# Patient Record
Sex: MALE | Race: WHITE | Employment: UNEMPLOYED | URBAN - METROPOLITAN AREA
[De-identification: names, ages, dates, MRNs, and addresses within clinical notes are randomized per-mention and may not be internally consistent; named-entity substitution may affect disease eponyms.]

---

## 2023-01-11 ENCOUNTER — HOSPITAL ENCOUNTER (EMERGENCY)
Age: 23
Discharge: HOME | End: 2023-01-11
Attending: STUDENT IN AN ORGANIZED HEALTH CARE EDUCATION/TRAINING PROGRAM

## 2023-01-11 VITALS
SYSTOLIC BLOOD PRESSURE: 127 MMHG | OXYGEN SATURATION: 96 % | TEMPERATURE: 97.7 F | WEIGHT: 185 LBS | HEART RATE: 93 BPM | HEIGHT: 67 IN | RESPIRATION RATE: 16 BRPM | BODY MASS INDEX: 29.03 KG/M2 | DIASTOLIC BLOOD PRESSURE: 87 MMHG

## 2023-01-11 DIAGNOSIS — Z20.2 EXPOSURE TO STD: ICD-10-CM

## 2023-01-11 DIAGNOSIS — Z87.19 HX OF HEMORRHOIDS: Primary | ICD-10-CM

## 2023-01-11 LAB
AMORPHOUS: ABNORMAL /HPF
BACTERIA: ABNORMAL /HPF
BILIRUBIN URINE: NEGATIVE
BLOOD, URINE: NEGATIVE
C. TRACHOMATIS DNA ,URINE: NEGATIVE
CLARITY: CLEAR
COLOR: YELLOW
GLUCOSE URINE: NEGATIVE MG/DL
KETONES, URINE: NEGATIVE MG/DL
LEUKOCYTE ESTERASE, URINE: NEGATIVE
MICROSCOPIC EXAMINATION: YES
MUCUS: ABNORMAL /LPF
N. GONORRHOEAE DNA, URINE: NEGATIVE
NITRITE, URINE: NEGATIVE
OCCULT BLOOD DIAGNOSTIC: NORMAL
PH UA: 8 (ref 5–8)
PROTEIN UA: ABNORMAL MG/DL
RBC UA: ABNORMAL /HPF (ref 0–4)
SPECIFIC GRAVITY UA: 1.01 (ref 1–1.03)
URINE TYPE: ABNORMAL
UROBILINOGEN, URINE: 4 E.U./DL
WBC UA: ABNORMAL /HPF (ref 0–5)

## 2023-01-11 PROCEDURE — 82270 OCCULT BLOOD FECES: CPT

## 2023-01-11 PROCEDURE — 81001 URINALYSIS AUTO W/SCOPE: CPT

## 2023-01-11 PROCEDURE — 87591 N.GONORRHOEAE DNA AMP PROB: CPT

## 2023-01-11 PROCEDURE — 87491 CHLMYD TRACH DNA AMP PROBE: CPT

## 2023-01-11 PROCEDURE — 99283 EMERGENCY DEPT VISIT LOW MDM: CPT

## 2023-01-11 RX ORDER — ALBUTEROL SULFATE 90 UG/1
2 AEROSOL, METERED RESPIRATORY (INHALATION) 4 TIMES DAILY PRN
Qty: 18 G | Refills: 0 | Status: SHIPPED | OUTPATIENT
Start: 2023-01-11 | End: 2023-01-22 | Stop reason: ALTCHOICE

## 2023-01-11 ASSESSMENT — PAIN - FUNCTIONAL ASSESSMENT: PAIN_FUNCTIONAL_ASSESSMENT: NONE - DENIES PAIN

## 2023-01-11 NOTE — ED PROVIDER NOTES
Emergency Department Encounter    Patient: Han Matthews  MRN: [id number]  : 2000  Date of Evaluation: 2023  ED Provider:  Mary Alice Harmon MD    Triage Chief Complaint:   No chief complaint on file. [place]:  Han Matthews is a 20 y.o. male that presents ***    ROS - see HPI, below listed is current ROS at time of my eval:  At least 14 systems reviewed, negative other HPI    No past medical history on file. No past surgical history on file. No family history on file. Social History     Socioeconomic History    Marital status: Single     Spouse name: Not on file    Number of children: Not on file    Years of education: Not on file    Highest education level: Not on file   Occupational History    Not on file   Tobacco Use    Smoking status: Not on file    Smokeless tobacco: Not on file   Substance and Sexual Activity    Alcohol use: Not on file    Drug use: Not on file    Sexual activity: Not on file   Other Topics Concern    Not on file   Social History Narrative    Not on file     Social Determinants of Health     Financial Resource Strain: Not on file   Food Insecurity: Not on file   Transportation Needs: Not on file   Physical Activity: Not on file   Stress: Not on file   Social Connections: Not on file   Intimate Partner Violence: Not on file   Housing Stability: Not on file     No current facility-administered medications for this encounter. No current outpatient medications on file. Not on File    Nursing Notes Reviewed    Physical Exam:  Triage VS:    ED Triage Vitals   Enc Vitals Group      BP       Pulse       Resp       Temp       Temp src       SpO2       Weight       Height       Head Circumference       Peak Flow       Pain Score       Pain Loc       Pain Edu? Excl. in [address]? My pulse ox interpretation is - normal    General appearance:  No acute distress. Skin:  Warm. Dry. Eye:  Extraocular movements intact.      Ears, nose, mouth and throat:  Oral mucosa moist Neck:  Trachea midline. Extremity:  No swelling. Normal ROM     Heart:  Regular rate and rhythm, normal S1 & S2, no extra heart sounds. Perfusion:  intact  Respiratory:  Lungs clear to auscultation bilaterally. Respirations nonlabored. Abdominal:  Normal bowel sounds. Soft. Nontender. Non distended. Back:  No CVA tenderness to palpation     Neurological:  Alert and oriented times 3. No focal neuro deficits. Psychiatric:  Appropriate    I have reviewed and interpreted all of the currently available lab results from this visit (if applicable):  No results found for this visit on 01/11/23. Radiographs (if obtained):  Radiologist's Report Reviewed:  No results found. EKG (if obtained): (All EKG's are interpreted by myself in the absence of a cardiologist)      MDM:  ***    Clinical Impression:  No diagnosis found. Disposition referral (if applicable):  No follow-up provider specified. Disposition medications (if applicable):  New Prescriptions    No medications on file     ED Provider Disposition Time  DISPOSITION        Comment: Please note this report has been produced using speech recognition software and may contain errors related to that system including errors in grammar, punctuation, and spelling, as well as words and phrases that may be inappropriate. Efforts were made to edit the dictations. Radiographs (if obtained):  Radiologist's Report Reviewed:  No results found. MDM:    76-year-old male present with history seen above. Vitals on presentation reassuring patient afebrile satting on room air. Physical exam can be seen above. Lungs are clear to auscultation, cardiac Ulises is reassuring, abdomen soft and nontender. Rectal exam I do not note any fistulas or fissures do not note any external hemorrhoids. Hemoccult is negative. Patient did have some left lower quadrant abdominal discomfort earlier this morning but denies any pain currently. I did discuss laboratory evaluation as well as CT imaging and risk and benefits and patient states he is only here because his mother asked me to come in to be evaluated does not wish to have any further testing performed at this time. As far as STIs patient states he was like to be tested. Patient denies any symptoms currently. I discussed that I cannot test for gonorrhea and chlamydia but that further testing of HIV and other STIs will need to be performed at the health department versus primary physician. Patient is agreeable to this. Gonorrhea and chlamydia was ordered and pending. Patient is overall very well-appearing I do believe is reasonable to hold off on any further evaluation as long as patient is agreeable to strict return precautions which he has as well as \close follow-up. Patient discharged home. Clinical Impression:  1. Hx of hemorrhoids    2. Exposure to STD          Comment: Please note this report has been produced using speech recognition software and may contain errors related to that system including errors in grammar, punctuation, and spelling, as well as words and phrases that may be inappropriate. Efforts were made to edit the dictations.         Zev James MD  01/19/23 1005

## 2023-01-11 NOTE — DISCHARGE INSTRUCTIONS
Follow-up with primary physician. I ordered for an urgent PCP follow-up for you. Return to emergency department for any abdominal pain, worsening rectal bleeding, only has her dizziness, chest pain or shortness of breath, fevers or chills or any new change or worsening symptoms were always here for evaluation. I do understand that you do not want any laboratory evaluation or imaging at this time and that you just want to check to see if he had hemorrhoids.   I do suggest that you follow-up with primary physician for further evaluation and treatment as well as testing for other STIs including HIV

## 2023-01-22 ENCOUNTER — HOSPITAL ENCOUNTER (EMERGENCY)
Age: 23
Discharge: HOME | End: 2023-01-22

## 2023-01-22 VITALS
TEMPERATURE: 98.2 F | BODY MASS INDEX: 28.04 KG/M2 | DIASTOLIC BLOOD PRESSURE: 78 MMHG | WEIGHT: 185 LBS | HEIGHT: 68 IN | SYSTOLIC BLOOD PRESSURE: 128 MMHG | OXYGEN SATURATION: 98 % | RESPIRATION RATE: 16 BRPM | HEART RATE: 97 BPM

## 2023-01-22 DIAGNOSIS — K62.5 RECTAL BLEEDING: Primary | ICD-10-CM

## 2023-01-22 DIAGNOSIS — K60.2 ANAL FISSURE: ICD-10-CM

## 2023-01-22 DIAGNOSIS — K59.00 CONSTIPATION, UNSPECIFIED CONSTIPATION TYPE: ICD-10-CM

## 2023-01-22 PROCEDURE — 6370000000 HC RX 637 (ALT 250 FOR IP): Performed by: PHYSICIAN ASSISTANT

## 2023-01-22 PROCEDURE — 99283 EMERGENCY DEPT VISIT LOW MDM: CPT

## 2023-01-22 PROCEDURE — 36415 COLL VENOUS BLD VENIPUNCTURE: CPT

## 2023-01-22 RX ORDER — DOXYCYCLINE HYCLATE 100 MG
100 TABLET ORAL ONCE
Status: COMPLETED | OUTPATIENT
Start: 2023-01-22 | End: 2023-01-22

## 2023-01-22 RX ORDER — DOXYCYCLINE HYCLATE 100 MG
100 TABLET ORAL 2 TIMES DAILY
Qty: 14 TABLET | Refills: 0 | Status: SHIPPED | OUTPATIENT
Start: 2023-01-22 | End: 2023-01-29

## 2023-01-22 RX ORDER — DOCUSATE SODIUM 100 MG/1
100 CAPSULE, LIQUID FILLED ORAL 2 TIMES DAILY
Qty: 60 CAPSULE | Refills: 0 | Status: SHIPPED | OUTPATIENT
Start: 2023-01-22 | End: 2023-02-21

## 2023-01-22 RX ORDER — POLYETHYLENE GLYCOL 3350 17 G/17G
17 POWDER, FOR SOLUTION ORAL 2 TIMES DAILY
Qty: 510 G | Refills: 0 | Status: SHIPPED | OUTPATIENT
Start: 2023-01-22 | End: 2023-02-21

## 2023-01-22 RX ADMIN — DOXYCYCLINE HYCLATE 100 MG: 100 TABLET, COATED ORAL at 17:43

## 2023-01-22 ASSESSMENT — PAIN - FUNCTIONAL ASSESSMENT: PAIN_FUNCTIONAL_ASSESSMENT: NONE - DENIES PAIN
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
## 2023-01-22 NOTE — DISCHARGE INSTRUCTIONS
Bloody Stools  Bloody stools (feces) often mean that there is a problem in the digestive tract. Your caregiver may use the term melena to describe black, tarry and bad smelling stools or hematochezia to describe red or maroon-colored stools. Blood seen in the stool can start anywhere along the intestinal tract. A black stool usually means that blood is coming from the upper part of the gastrointestinal (GI) tract (esophagus, stomach, or small bowel). Passing maroon-colored stools or bright red blood (hematochezia) usually suggests that blood is coming from lower down in the large bowel or the rectum. However, sometimes massive bleeding in the stomach or small intestine can cause bright red bloody stools. The ingestion of black licorice, lead, iron pills, medications containing bismuth subsalicylate, or blueberries can also cause black stools. Your caregiver can test black stools to see if blood is present. It is important that the cause of rectal bleeding be found. Treatment can then be started, and the problem corrected. Rectal bleeding may not be serious. But you should not assume everything is well until you know the cause. SOME CAUSES OF RECTAL BLEEDING   Hemorrhoids are larger (dilated) blood vessels or veins in the anal or rectal area. They can occur on the outside where they are felt as small bumps when wiping, or they may be on the inside where they are usually painless. They often develop with longstanding (chronic) constipation and during pregnancy. Hemorrhoids are usually treated with stool softeners to reduce straining. Fistulas are abnormal, burrowing channels. They usually run from inside the rectum to the skin around the anus. They may drain a whitish discharge. But they can also bleed. It is usually a local problem. But a fistula can be associated with chronic inflammation in other parts of the gut (intestinal tract) such as Crohn's disease.  Fistulas are treated with medications that kill germs (antibiotics) and hot baths. If they persist, surgery may be needed. A Fissure may occur with the passage of a hard stool or with severe diarrhea. The tissue lining the anus is torn. Nerve endings and blood vessels are exposed. So pain and bleeding occur with bowel movements. Frequent warm baths and bulking agents, used to keep stools soft, usually correct this problem. Sometimes surgery is needed. Diverticulosis is a condition in which pockets, or sacs, project from the bowel wall. The sacs balloon out over the years due to recurrent, high-pressure spasms of the colon. Sometimes, they can bleed. They usually produce a lot of blood, and it comes all at one time. Serious, persistent diverticular bleeding usually requires hospitalization and at times, surgery. Proctitis and Colitis are conditions in which the rectum, colon or both can become red and sore (inflamed) and pitted (ulcerated). There are a number of disorders that cause the inside bowel surface to become ulcerated and bleed. There may be rectal urgency, cramps or diarrhea associated with the bleeding. When the inflammation is restricted to the rectum, the condition is called proctitis. When the colon is involved, it is called colitis. It is important to find the specific cause of the inflammation so that treatment can be started. Polyps and Cancer - Polyps are non-cancerous (benign) growths in the colon that may bleed. Certain types of polyps turn into cancer. Colon cancer is often curable when discovered early. It most often happens in people over the age of 48. But it may happen in younger people, even those in their 29's or younger. Because colon cancer is such a common cancer, it is always considered a possible cause of bleeding. Rectal Prolapse - Often, as older individuals develop weakened rectal support tissues, part of the rectum can fall down from the anus and bleed. This condition is called rectal prolapse.  It can be felt as an abnormal bulging from the rectum when wiping. Surgery is the only effective treatment. Gastritis and Ulcers - Gastritis is inflammation and irritation of the stomach. Ulcers are pits in the lining of the stomach. Both can cause bleeding, sometimes in large amounts. Usually this bleeding appears as black, tarry and bad smelling stools (melena). Medications are usually used to treat the condition. SYMPTOMS  You may have stools that are bright red and bloody, that are normal color with blood on them, or that are dark black and tarry. You may only have blood in the toilet bowl. Any of these need medical care. You may also have:  Pain at the rectum or anywhere in the rectum. Lightheadedness or feel faint. Extreme weakness. Nausea or vomiting. Fever. DIAGNOSING RECTAL BLEEDING  The Medical History - Age is important. Older people tend to develop polyps and cancer more often. If there is anal pain and a hard, large stool associated with bleeding, a tear of the anus may be the cause. If blood drips into the toilet after a bowel movement, bleeding hemorrhoids may be the problem. The color and frequency of the bleeding are additional considerations. In most cases, the medical history provides clues, but seldom the final answer. The Visual and Finger (digital) Exam - Your caregiver will inspect the anal area looking for tears and hemorrhoids. A finger exam can provide information when there is tenderness or a growth inside. In men, the prostate is also examined. Endoscopy - Several types of small, long viewing scopes (endoscopes) are used to view the colon. In the office, the caregiver may use a rigid or more commonly, a flexible viewing sigmoidoscope. This exam is called flexible sigmoidoscopy. It is performed in 5 to 10 minutes. A more thorough exam is accomplished with a colonoscope. It allows the caregiver to view the entire 5 to 6 foot long colon.  Medicine to make the patient sleep (sedation) is usually given for this exam. Frequently, a bleeding lesion may present beyond the reach of the sigmoidoscope. So a colonoscopy may be the best exam to start with. Both exams are usually done on an outpatient basis. This means the patient does not stay overnight in the hospital or surgery center. An upper endoscopy may be needed to examine your stomach. Sedation is used and a flexible endoscope is put in your mouth down to your stomach. Barium Enema X-ray - This is an x-ray exam. It uses liquid barium inserted by enema into the rectum. This test alone may not identify an actual bleeding point. X-rays highlight abnormal shadows, such as those made by:  Lumps (tumors). Diverticuli. Colitis. TREATMENT  Treatment depends on the cause of your bleeding. For bleeding from the stomach or colon, the specialist doing your endoscopy or colonoscopy may be able to stop the bleeding as part of the procedure. Inflammation or infection of the colon can be treated with medications. Many rectal problems can be treated with creams, suppositories, and/or warm baths. Surgery is sometimes needed. For any bleeding problem, let your caregiver know if you take aspirin or other blood thinners regularly. Blood transfusions are sometimes needed if you have lost a lot of blood. HOME CARE INSTRUCTIONS  Take any medications exactly as prescribed. Keep your stools soft by eating a diet high in fiber and drinking lots of fluid. Prunes (one to three a day) work well for many people. Take sitz baths if advised. A sitz bath is when you sit in a bathtub with warm water for 10-15 minutes to soak, sooth and cleanse the rectal area. If enemas or suppositories are advised, be sure you know how to use them. Tell your caregiver if you have problems with this. Monitor your bowel movements to look for signs of improvement (or worsening). SEEK MEDICAL CARE IF:  You do not improve in the time expected. Your condition worsens after initial improvement.   You develop any new symptoms. SEEK IMMEDIATE MEDICAL CARE IF:  You develop severe or prolonged rectal bleeding. You vomit blood. You feel weak or faint. You develop a fever over 102° F (38.9° C).

## 2023-02-10 ENCOUNTER — HOSPITAL ENCOUNTER (EMERGENCY)
Age: 23
Discharge: HOME | End: 2023-02-10
Attending: EMERGENCY MEDICINE

## 2023-02-10 VITALS
TEMPERATURE: 98.2 F | DIASTOLIC BLOOD PRESSURE: 82 MMHG | OXYGEN SATURATION: 98 % | SYSTOLIC BLOOD PRESSURE: 143 MMHG | RESPIRATION RATE: 20 BRPM | HEART RATE: 70 BPM

## 2023-02-10 DIAGNOSIS — J03.90 ACUTE TONSILLITIS, UNSPECIFIED ETIOLOGY: Primary | ICD-10-CM

## 2023-02-10 LAB — S PYO AG THROAT QL: NEGATIVE

## 2023-02-10 PROCEDURE — 87880 STREP A ASSAY W/OPTIC: CPT

## 2023-02-10 PROCEDURE — 99283 EMERGENCY DEPT VISIT LOW MDM: CPT

## 2023-02-10 PROCEDURE — 87081 CULTURE SCREEN ONLY: CPT

## 2023-02-10 ASSESSMENT — PAIN - FUNCTIONAL ASSESSMENT: PAIN_FUNCTIONAL_ASSESSMENT: NONE - DENIES PAIN

## 2023-02-10 NOTE — DISCHARGE INSTRUCTIONS
Strep was negative. Please follow-up with ENT for further evaluation and treatment. If persistent or worsening symptoms, or if you have any concerns, return to ED immediately.

## 2023-02-10 NOTE — ED PROVIDER NOTES
[address] [facility]    EMERGENCY DEPARTMENT ENCOUNTER        Patient Name: Sydnee Ni  MRN: [id number]  [place] 2000  Date of evaluation: 2/10/2023  PCP: No primary care provider on file. Note Started: 6:53 PM EST 2/10/23    CHIEF COMPLAINT       Pharyngitis (Pt to ED with complaint of sore throat x few days. Pt denies any fevers. All vitals stable. )      HISTORY OF PRESENT ILLNESS: 1 or more Elements       Sydnee Ni is a 20 y.o. male  who presents to the ED for evaluation of sore throat. Reports he has strep all the time and has seen an ENT to discuss getting his tonsils out. Says he started having sore throat again and was unable to go to work and came in for evaluation. Says he normally does not take antibiotics for the strep. No fevers or chills. Has pain with swallowing. No difficulty swallowing. No other symptoms. No other complaints, modifying factors or associated symptoms. History obtained by the patient unless stated otherwise as above on HPI. No limitations unless specified as above on HPI. PMH, Surgical Hx, FH, Social Hx reviewed by myself. Patient's care impacted by the following conditions. History reviewed. No pertinent past medical history. Past Surgical History:   Procedure Laterality Date    TYMPANOSTOMY TUBE PLACEMENT Bilateral      History reviewed. No pertinent family history.   Social History     Socioeconomic History    Marital status: Single     Spouse name: Not on file    Number of children: Not on file    Years of education: Not on file    Highest education level: Not on file   Occupational History    Not on file   Tobacco Use    Smoking status: Never    Smokeless tobacco: Never   Vaping Use    Vaping Use: Never used   Substance and Sexual Activity    Alcohol use: Not Currently    Drug use: Not Currently    Sexual activity: Not on file   Other Topics Concern    Not on file   Social History Narrative    Not on file     Social Determinants of Health Financial Resource Strain: Not on file   Food Insecurity: Not on file   Transportation Needs: Not on file   Physical Activity: Not on file   Stress: Not on file   Social Connections: Not on file   Intimate Partner Violence: Not on file   Housing Stability: Not on file     No current facility-administered medications for this encounter. Current Outpatient Medications   Medication Sig Dispense Refill    polyethylene glycol (GLYCOLAX) 17 GM/SCOOP powder Take 17 g by mouth 2 times daily BID x 7 days, QD thereafter 510 g 0    docusate sodium (COLACE) 100 MG capsule Take 1 capsule by mouth 2 times daily 60 capsule 0     No Known Allergies      REVIEW OF SYSTEMS :      Review of Systems  10 systems reviewed, pertinent positives per HPI otherwise noted to be negative. SCREENINGS        [place]erry Coma Scale  Eye Opening: Spontaneous  Best Verbal Response: Oriented  Best Motor Response: Obeys commands  Kerry Coma Scale Score: 15                CIWA Assessment  BP: (!) 143/82  Heart Rate: 70           PHYSICAL EXAM  1 or more Elements     ED Triage Vitals [02/10/23 1812]   BP Temp Temp Source Heart Rate Resp SpO2 Height Weight   (!) 143/82 98.2 °F (36.8 °C) Oral 70 20 98 % -- --       GENERAL APPEARANCE: Awake and alert. No acute distress. HENT:  Normocephalic. Atraumatic. EOMI. No facial droop. Normal voice. Uvula midline. No PTA. No tonsillar exudates. No facial swelling. HEART/CHEST: RRR. LUNGS: Respirations unlabored. Speaking comfortably in full sentences. ABDOMEN: Soft, non-distended abdomen. Non tender to palpation. No guarding. No rebound. EXTREMITIES: no gross deformities. Moving all extremities. SKIN: Warm and dry. No acute rashes. NEUROLOGICAL: Alert and oriented. No gross facial drooping. Answering questions appropriately. Moving all extremities. PSYCHIATRIC: Pleasant. Normal mood and affect.        DIAGNOSTIC RESULTS   LABS:    Labs Reviewed   STREP SCREEN GROUP A THROAT   CULTURE, BETA STREP CONFIRM PLATES    Narrative:     ORDER#: [id number]                          ORDERED BY: Jaguar Hilton                  SOURCE: Throat                             COLLECTED:  02/10/23 18:10                  ANTIBIOTICS AT ADRYAN.:                      RECEIVED :  02/10/23 18:38       When ordered only abnormal lab results are displayed. All other labs were within normal range or not returned as of this dictation. RADIOLOGY:   Non-plain film images such as CT, Ultrasound and MRI are read by the radiologist. Plain radiographic images are visualized and preliminarily interpreted by the ED Provider with the below findings:    N/A    Interpretation per the Radiologist below, if available at the time of this note:    No orders to display     No results found. Bedside Ultrasound, as interpreted by me, if performed:    No results found. PROCEDURES     Unless otherwise noted below, none     Procedures    CRITICAL CARE TIME     I personally spent a total of 0 minutes of critical care time in obtaining history, performing a physical exam, bedside monitoring of interventions, collecting and interpreting tests and discussion with consultants but excluding time spent performing procedures, treating other patients and teaching time. EMERGENCY DEPARTMENT COURSE and DIFFERENTIAL DIAGNOSIS/MDM:     Patient seen and evaluated. At presentation, patient was awake, alert, afebrile, hemodynamically stable, and satting well on room air. No tonsillar exudates on exam. Uvula midline. No peritonsillar abscess visible. Strep test ordered in triage. It was negative. Discussed this with patient. Although patient reports having multiple ER visits and visits to ENT, I do not see that on record. Given this, patient provided with referral for ENT and instructed to f/u for further evaluation and treatment. Discharge.      CONSULTS: (Who and What was discussed)  None    Is this patient to be included in the SEP-1 Core Measure due to severe sepsis or septic shock? No   Exclusion criteria - the patient is NOT to be included for SEP-1 Core Measure due to:  2+ SIRS criteria are not met       During the patient's ED course, the patient was given:  Medications - No data to display         I am the Primary Clinician of Record. FINAL IMPRESSION      1. Acute tonsillitis, unspecified etiology          DISPOSITION/PLAN     DISPOSITION Decision To Discharge 02/10/2023 07:00:28 PM      PATIENT REFERRED TO:  [address] [place] [address] [address] [place] [address] [place] [address]  In 2 days      DISCHARGE MEDICATIONS:  Patient was given scripts for the following medications. I counseled patient how to take these medications:  Discharge Medication List as of 2/10/2023  7:05 PM          DISCONTINUED MEDICATIONS:  Discharge Medication List as of 2/10/2023  7:05 PM          Pt was seen during the COVID 19 pandemic. Appropriate PPE worn by ME during patient encounters. Patient was cared for during a time with constrained hospital bed capacity with nationwide stress on resources and staffing. (This chart was generated in part by using Dragon Dictation system and may contain errors related to that system including errors in grammar, punctuation, and spelling, as well as words and phrases that may be inappropriate.  If there are any questions or concerns please feel free to contact the dictating provider for clarification.)         Yogesh Sloan MD  02/12/23 2034

## 2023-02-10 NOTE — Clinical Note
Jan Cruz was seen and treated in our emergency department on 2/10/2023. He may return to work on 02/13/2023. If you have any questions or concerns, please don't hesitate to call.       Jackie Estrada MD

## 2023-02-11 NOTE — ED NOTES
All follow up care discussed. Pt verbalized understandings. No other concerns voiced. Stable and ambulatory to [place].       [facility]  02/10/23 1908

## 2023-02-12 LAB — S PYO THROAT QL CULT: NORMAL

## 2023-02-13 ENCOUNTER — HOSPITAL ENCOUNTER (EMERGENCY)
Age: 23
Discharge: HOME | End: 2023-02-13

## 2023-02-13 VITALS
OXYGEN SATURATION: 96 % | BODY MASS INDEX: 28.13 KG/M2 | RESPIRATION RATE: 16 BRPM | DIASTOLIC BLOOD PRESSURE: 87 MMHG | HEART RATE: 94 BPM | HEIGHT: 68 IN | SYSTOLIC BLOOD PRESSURE: 137 MMHG | TEMPERATURE: 98.2 F

## 2023-02-13 DIAGNOSIS — Z53.29 LEFT AGAINST MEDICAL ADVICE: ICD-10-CM

## 2023-02-13 DIAGNOSIS — R07.9 CHEST PAIN, UNSPECIFIED TYPE: Primary | ICD-10-CM

## 2023-02-13 LAB
INFLUENZA A: NOT DETECTED
INFLUENZA B: NOT DETECTED
SARS-COV-2 RNA, RT PCR: NOT DETECTED

## 2023-02-13 PROCEDURE — 99283 EMERGENCY DEPT VISIT LOW MDM: CPT

## 2023-02-13 PROCEDURE — 87636 SARSCOV2 & INF A&B AMP PRB: CPT

## 2023-02-13 ASSESSMENT — PAIN DESCRIPTION - FREQUENCY: FREQUENCY: CONTINUOUS

## 2023-02-13 ASSESSMENT — PAIN DESCRIPTION - LOCATION: LOCATION: CHEST

## 2023-02-13 ASSESSMENT — PAIN DESCRIPTION - PAIN TYPE: TYPE: ACUTE PAIN

## 2023-02-13 ASSESSMENT — PAIN - FUNCTIONAL ASSESSMENT: PAIN_FUNCTIONAL_ASSESSMENT: 0-10

## 2023-02-13 ASSESSMENT — PAIN SCALES - GENERAL: PAINLEVEL_OUTOF10: 9

## 2023-02-13 ASSESSMENT — ENCOUNTER SYMPTOMS
GASTROINTESTINAL NEGATIVE: 1
SHORTNESS OF BREATH: 1

## 2023-02-13 ASSESSMENT — PAIN DESCRIPTION - ORIENTATION: ORIENTATION: MID

## 2023-02-13 NOTE — ED PROVIDER NOTES
[address] [facility]  EMERGENCY DEPARTMENT ENCOUNTER        Pt Name: Marely Uribe  MRN: [id number]  [place] 2000  Date of evaluation: 2/13/2023  Provider: Brandon Jha PA-C  PCP: No primary care provider on file. Note Started: 5:00 PM EST 2/13/23      BALDOMERO. I have evaluated this patient. My supervising physician was available for consultation. CHIEF COMPLAINT       Chief Complaint   Patient presents with    Positive For Covid-19     Pt took at home covid test 2 were negative 1 was positive. Pt wanting clarification. Pt c/o HA, diarrhea, dizzy, sore throat. Pt refusing blood work       HISTORY OF PRESENT ILLNESS: 1 or more Elements     History From: patient      Marely Uribe is a 20 y.o. male with a past medical history brought in today by private vehicle with complaints of chest pain and increased shortness of breath. Onset of symptoms over the past several days. Duration of symptoms have been persistent since onset. He denies fever chills nausea vomiting diarrhea. Denies cough or congestion. States he would also like a COVID test as he has had 2 negative tests and one positive and would like confirmation. Includes chest pain and shortness of breath. No aggravating symptoms. No alleviating symptoms. He has not taken anything at this time for symptomatic relief. Rates his current pain a 9 out of 10 no radiation. He denies any numbness or tingling. Denies abdominal pain. Denies urinary complaints. Otherwise denies any other complaints. Nothing seems to make symptoms better or worse. Nursing Notes were all reviewed and agreed with or any disagreements were addressed in the HPI. REVIEW OF SYSTEMS :      Review of Systems   Constitutional: Negative. HENT: Negative. Respiratory:  Positive for shortness of breath. Cardiovascular:  Positive for chest pain. Gastrointestinal: Negative. Genitourinary: Negative. Musculoskeletal: Negative. Skin: Negative. Neurological: Negative. Positives and Pertinent negatives as per HPI. SURGICAL HISTORY     Past Surgical History:   Procedure Laterality Date    TYMPANOSTOMY TUBE PLACEMENT Bilateral        CURRENTMEDICATIONS       Discharge Medication List as of 2/13/2023  5:09 PM        CONTINUE these medications which have NOT CHANGED    Details   polyethylene glycol (GLYCOLAX) 17 GM/SCOOP powder Take 17 g by mouth 2 times daily BID x 7 days, QD thereafter, Disp-510 g, R-0Normal      docusate sodium (COLACE) 100 MG capsule Take 1 capsule by mouth 2 times daily, Disp-60 capsule, R-0Normal             ALLERGIES     Patient has no known allergies. FAMILYHISTORY     History reviewed. No pertinent family history. SOCIAL HISTORY       Social History     Tobacco Use    Smoking status: Never    Smokeless tobacco: Never   Vaping Use    Vaping Use: Never used   Substance Use Topics    Alcohol use: Not Currently    Drug use: Not Currently       SCREENINGS        [place] Coma Scale  Eye Opening: Spontaneous  Best Verbal Response: Oriented  Best Motor Response: Obeys commands  [place] Coma Scale Score: 15                CIWA Assessment  BP: 137/87  Heart Rate: 94           PHYSICAL EXAM  1 or more Elements     ED Triage Vitals [02/13/23 1610]   BP Temp Temp Source Heart Rate Resp SpO2 Height Weight   137/87 98.2 °F (36.8 °C) Oral 94 16 96 % 5' 8\" (1.727 m) --       Physical Exam  Vitals and nursing note reviewed. Constitutional:       General: He is awake. He is not in acute distress. Appearance: Normal appearance. He is well-developed and overweight. He is not ill-appearing, toxic-appearing or diaphoretic. HENT:      Head: Normocephalic and atraumatic. Nose: Nose normal.   Eyes:      General:         Right eye: No discharge. Left eye: No discharge. Cardiovascular:      Rate and Rhythm: Normal rate and regular rhythm.       Pulses:           Radial pulses are 2+ on the right side and 2+ on the left side.      Heart sounds: Normal heart sounds. No murmur heard. No gallop. Pulmonary:      Effort: Pulmonary effort is normal. No respiratory distress. Breath sounds: Normal breath sounds. No decreased breath sounds, wheezing, rhonchi or rales. Chest:      Chest wall: No tenderness. Musculoskeletal:         General: No deformity. Normal range of motion. Cervical back: Normal range of motion and neck supple. Right lower leg: No edema. Left lower leg: No edema. Skin:     General: Skin is warm and dry. Neurological:      Mental Status: He is alert and oriented to person, place, and time. Psychiatric:         Behavior: Behavior normal. Behavior is cooperative. DIAGNOSTIC RESULTS   LABS:    Labs Reviewed   COVID-19 & INFLUENZA COMBO       When ordered only abnormal lab results are displayed. All other labs were within normal range or not returned as of this dictation. EKG: When ordered, EKG's are interpreted by the Emergency Department Physician in the absence of a cardiologist.  Please see their note for interpretation of EKG. RADIOLOGY:   Non-plain film images such as CT, Ultrasound and MRI are read by the radiologist. Plain radiographic images are visualized and preliminarily interpreted by the ED Provider with the below findings:        Interpretation per the Radiologist below, if available at the time of this note:    No orders to display     No results found. No results found. PROCEDURES   Unless otherwise noted below, none     Procedures    CRITICAL CARE TIME (.cctime)       PAST MEDICAL HISTORY      has no past medical history on file.      EMERGENCY DEPARTMENT COURSE and DIFFERENTIAL DIAGNOSIS/MDM:   Vitals:    Vitals:    02/13/23 1610   BP: 137/87   Pulse: 94   Resp: 16   Temp: 98.2 °F (36.8 °C)   TempSrc: Oral   SpO2: 96%   Height: 5' 8\" (1.727 m)       Patient was given the following medications:  Medications - No data to display          Is this patient to be included in the SEP-1 Core Measure due to severe sepsis or septic shock? No   Exclusion criteria - the patient is NOT to be included for SEP-1 Core Measure due to:  2+ SIRS criteria are not met    Chronic Conditions affecting care: NONE   has no past medical history on file. CONSULTS: (Who and What was discussed)  None          Records Reviewed (External and Source) NONE    CC/HPI Summary, DDx, ED Course, and Reassessment:     Brought in today by private vehicle with complaints of chest pain shortness of breath requesting a COVID test.  On exam he is alert oriented afebrile breathing on room air satting at 96%. Nontoxic. No acute respiratory distress. Old labs and records reviewed. Patient was seen by myself and my attending was available as needed for consultation. COVID and flu are negative. I would like to do additional testing including EKG chest x-ray and laboratory studies however patient is refusing and states I need to leave. He is refusing blood work EKG and imaging at this time. I did discuss that he would be leaving [address] and there are risks with leaving [address] I did discuss the risks including but not limited to increased injury disability or even death. He did have sound mind and full capacity to make this decision. He states \"I just need a work note and need to leave because my phone is going to die\". Patient was told he could return to the ED at any time. Patient signed [employer] paperwork. Disposition Considerations (tests considered but not done, Admit vs D/C, Shared Decision Making, Pt Expectation of Test or Tx.):       I am the Primary Clinician of Record. FINAL IMPRESSION      1. Chest pain, unspecified type    2.  Left against medical advice          DISPOSITION/PLAN     DISPOSITION Decision To Discharge 02/13/2023 05:08:21 PM      PATIENT REFERRED TO:  [address]  [address] [address]  [phone]  Schedule an appointment as soon as possible for a visit   If symptoms worsen    DISCHARGE MEDICATIONS:  Discharge Medication List as of 2/13/2023  5:09 PM          DISCONTINUED MEDICATIONS:  Discharge Medication List as of 2/13/2023  5:09 PM                 (Please note that portions of this note were completed with a voice recognition program.  Efforts were made to edit the dictations but occasionally words are mis-transcribed.)    Trung Briseno PA-C (electronically signed)        Trung Briseno PA-C  02/13/23 3268

## 2023-02-18 ENCOUNTER — HOSPITAL ENCOUNTER (EMERGENCY)
Age: 23
Discharge: HOME | End: 2023-02-18
Attending: EMERGENCY MEDICINE

## 2023-02-18 VITALS
WEIGHT: 170 LBS | SYSTOLIC BLOOD PRESSURE: 118 MMHG | RESPIRATION RATE: 20 BRPM | OXYGEN SATURATION: 97 % | TEMPERATURE: 98.5 F | BODY MASS INDEX: 26.68 KG/M2 | HEIGHT: 67 IN | HEART RATE: 90 BPM | DIASTOLIC BLOOD PRESSURE: 84 MMHG

## 2023-02-18 DIAGNOSIS — R21 RASH OF GENITALIA: Primary | ICD-10-CM

## 2023-02-18 DIAGNOSIS — Z20.2 POSSIBLE EXPOSURE TO STD: ICD-10-CM

## 2023-02-18 LAB
BILIRUBIN URINE: NEGATIVE
BLOOD, URINE: NEGATIVE
CLARITY: CLEAR
COLOR: YELLOW
GLUCOSE URINE: NEGATIVE MG/DL
KETONES, URINE: NEGATIVE MG/DL
LEUKOCYTE ESTERASE, URINE: NEGATIVE
MICROSCOPIC EXAMINATION: NORMAL
NITRITE, URINE: NEGATIVE
PH UA: 6 (ref 5–8)
PROTEIN UA: NEGATIVE MG/DL
RBC UA: NORMAL /HPF (ref 0–4)
SPECIFIC GRAVITY UA: 1.02 (ref 1–1.03)
URINE TRICHOMONAS EVALUATION: NORMAL
URINE TYPE: NORMAL
UROBILINOGEN, URINE: 0.2 E.U./DL
WBC UA: NORMAL /HPF (ref 0–5)

## 2023-02-18 PROCEDURE — 81001 URINALYSIS AUTO W/SCOPE: CPT

## 2023-02-18 PROCEDURE — 87491 CHLMYD TRACH DNA AMP PROBE: CPT

## 2023-02-18 PROCEDURE — 86696 HERPES SIMPLEX TYPE 2 TEST: CPT

## 2023-02-18 PROCEDURE — 6370000000 HC RX 637 (ALT 250 FOR IP): Performed by: EMERGENCY MEDICINE

## 2023-02-18 PROCEDURE — 87253 VIRUS INOCULATE TISSUE ADDL: CPT

## 2023-02-18 PROCEDURE — 86695 HERPES SIMPLEX TYPE 1 TEST: CPT

## 2023-02-18 PROCEDURE — 87252 VIRUS INOCULATION TISSUE: CPT

## 2023-02-18 PROCEDURE — 6360000002 HC RX W HCPCS: Performed by: EMERGENCY MEDICINE

## 2023-02-18 PROCEDURE — 99284 EMERGENCY DEPT VISIT MOD MDM: CPT

## 2023-02-18 PROCEDURE — 87591 N.GONORRHOEAE DNA AMP PROB: CPT

## 2023-02-18 PROCEDURE — 96372 THER/PROPH/DIAG INJ SC/IM: CPT

## 2023-02-18 RX ORDER — ACYCLOVIR 400 MG/1
400 TABLET ORAL 3 TIMES DAILY
Qty: 21 TABLET | Refills: 0 | Status: SHIPPED | OUTPATIENT
Start: 2023-02-18 | End: 2023-02-25

## 2023-02-18 RX ORDER — AZITHROMYCIN 250 MG/1
1000 TABLET, FILM COATED ORAL ONCE
Status: COMPLETED | OUTPATIENT
Start: 2023-02-18 | End: 2023-02-18

## 2023-02-18 RX ORDER — METHYLPHENIDATE HYDROCHLORIDE 36 MG/1
36 TABLET ORAL EVERY MORNING
COMMUNITY

## 2023-02-18 RX ORDER — BUSPIRONE HYDROCHLORIDE 5 MG/1
5 TABLET ORAL 3 TIMES DAILY
COMMUNITY

## 2023-02-18 RX ORDER — CEFTRIAXONE 1 G/1
500 INJECTION, POWDER, FOR SOLUTION INTRAMUSCULAR; INTRAVENOUS ONCE
Status: COMPLETED | OUTPATIENT
Start: 2023-02-18 | End: 2023-02-18

## 2023-02-18 RX ORDER — ARIPIPRAZOLE 10 MG/1
10 TABLET ORAL DAILY
COMMUNITY

## 2023-02-18 RX ADMIN — AZITHROMYCIN MONOHYDRATE 1000 MG: 250 TABLET ORAL at 04:01

## 2023-02-18 RX ADMIN — CEFTRIAXONE SODIUM 500 MG: 1 INJECTION, POWDER, FOR SOLUTION INTRAMUSCULAR; INTRAVENOUS at 04:03

## 2023-02-18 RX ADMIN — PENICILLIN G BENZATHINE AND PENICILLIN G PROCAINE 2.4 MILLION UNITS: 600000; 600000 INJECTION, SUSPENSION INTRAMUSCULAR at 04:02

## 2023-02-18 ASSESSMENT — PAIN - FUNCTIONAL ASSESSMENT: PAIN_FUNCTIONAL_ASSESSMENT: NONE - DENIES PAIN

## 2023-02-18 NOTE — DISCHARGE INSTRUCTIONS
Follow-up with the results of the chlamydia, gonorrhea, and herpes. Please establish care with PCP for further evaluation and treatment which may include HIV testing, syphilis testing, and others. Take the acyclovir as prescribed. If herpes test is negative, you may discontinue. If persistent or worsening symptoms, or if you have any concerns, return to ED immediately.

## 2023-02-18 NOTE — ED NOTES
[facility] for d/c. Stable and ambulatory w/ all personal belongings.  No questions at d/c.      Jocelyn Louis, SHAKA  02/18/23 4313

## 2023-02-18 NOTE — ED PROVIDER NOTES
[address] [facility]  ED    EMERGENCY DEPARTMENT ENCOUNTER        Patient Name: Emerson Cruz  MRN: [id number]  [place] 2000  Date of evaluation: 2/18/2023  PCP: No primary care provider on file. Note Started: 3:39 AM EST 2/18/23    CHIEF COMPLAINT       Exposure to STD (Ex called and told pt he had been exposed to \"lots of STD's. \" Rash in genital area. )      HISTORY OF PRESENT ILLNESS: 1 or more Elements       Emerson Cruz is a 20 y.o. male with history of anxiety, history of syphilis who presents to the emergency department for evaluation of possible STD. Patient reports Developing of rash in the genital area today. Says he has been told by his ex and was told that he had been exposed to lots of STDs. Says the rash has not been painful. No fevers or chills. Denies having any other concerns. Reports having history of syphilis. No other complaints, modifying factors or associated symptoms. History obtained by the patient unless stated otherwise as above on HPI. No limitations unless specified as above on HPI. PMH, Surgical Hx, FH, Social Hx reviewed by myself. Patient's care impacted by the following conditions. Past Medical History:   Diagnosis Date    Anxiety     Bipolar 1 disorder ([address]     Depression     Diabetes mellitus ([address]     borderline     Past Surgical History:   Procedure Laterality Date    CYST REMOVAL      tailbone    TYMPANOSTOMY TUBE PLACEMENT Bilateral      History reviewed. No pertinent family history. Social History     Socioeconomic History    Marital status: Single     Spouse name: Not on file    Number of children: Not on file    Years of education: Not on file    Highest education level: Not on file   Occupational History    Not on file   Tobacco Use    Smoking status: Some Days     Types: Cigarettes    Smokeless tobacco: Current   Vaping Use    Vaping Use: Some days   Substance and Sexual Activity    Alcohol use: Yes     Comment: occ    Drug use:  Yes Types: Marijuana [place])    Sexual activity: Yes     Partners: Male   Other Topics Concern    Not on file   Social History Narrative    Not on file     Social Determinants of Health     Financial Resource Strain: Not on file   Food Insecurity: Not on file   Transportation Needs: Not on file   Physical Activity: Not on file   Stress: Not on file   Social Connections: Not on file   Intimate Partner Violence: Not on file   Housing Stability: Not on file     Current Facility-Administered Medications   Medication Dose Route Frequency Provider Last Rate Last Admin    cefTRIAXone (ROCEPHIN) injection 500 mg  500 mg IntraMUSCular Once Ilsa MD Ayah        azithromycin [facility]) tablet 1,000 mg  1,000 mg Oral Once Ilsa MD Ayah        penicillin G benzathine and procaine (BICILLIN C-R) 4229625 UNIT per 2ML suspension 2.4 Million Units  2.4 Million Units IntraMUSCular Once Ilsa MD Ayah         Current Outpatient Medications   Medication Sig Dispense Refill    methylphenidate (CONCERTA) 36 MG extended release tablet Take 36 mg by mouth every morning. ARIPiprazole (ABILIFY) 10 MG tablet Take 10 mg by mouth daily      busPIRone (BUSPAR) 5 MG tablet Take 5 mg by mouth 3 times daily      polyethylene glycol (GLYCOLAX) 17 GM/SCOOP powder Take 17 g by mouth 2 times daily BID x 7 days, QD thereafter 510 g 0    docusate sodium (COLACE) 100 MG capsule Take 1 capsule by mouth 2 times daily 60 capsule 0     No Known Allergies      REVIEW OF SYSTEMS :      Review of Systems  10 systems reviewed, pertinent positives per HPI otherwise noted to be negative.       SCREENINGS        [place] Coma Scale  Eye Opening: Spontaneous  Best Verbal Response: Oriented  Best Motor Response: Obeys commands  [place] Coma Scale Score: 15                CIWA Assessment  BP: 118/84  Heart Rate: 90           PHYSICAL EXAM  1 or more Elements     ED Triage Vitals   BP Temp Temp src Heart Rate Resp SpO2 Height Weight   02/18/23 0014 02/18/23 0259 -- 02/18/23 0259 02/18/23 0259 02/18/23 0259 02/18/23 0240 02/18/23 0240   118/84 98.5 °F (36.9 °C)  90 20 97 % 5' 7\" (1.702 m) 170 lb (77.1 kg)       GENERAL APPEARANCE: Awake and alert. No acute distress. HENT: Normocephalic. Atraumatic. EOMI. No facial droop. LUNGS: Respirations unlabored. Speaking comfortably in full sentences. : 0.2cm ulcerated lesions on penis and scrotum. EXTREMITIES: No gross deformities. Moving all extremities. SKIN: Warm and dry. No acute rashes. NEUROLOGICAL: Alert and oriented. No gross facial drooping. Answering questions appropriately. Moving all extremities. PSYCHIATRIC: Pleasant. Anxious appearing      DIAGNOSTIC RESULTS   LABS:    Labs Reviewed   C.TRACHOMATIS N.GONORRHOEAE DNA, URINE   URINALYSIS WITH MICROSCOPIC   URINE TRICHOMONAS EVALUATION   HERPES SIMPLEX VIRUS (HSV) I/II ANTIBODIES IGG & IGM       When ordered only abnormal lab results are displayed. All other labs were within normal range or not returned as of this dictation. RADIOLOGY:   Non-plain film images such as CT, Ultrasound and MRI are read by the radiologist. Plain radiographic images are visualized and preliminarily interpreted by the ED Provider with the below findings:    N/A    Interpretation per the Radiologist below, if available at the time of this note:    No orders to display     No results found. Bedside Ultrasound, as interpreted by me, if performed:    No results found. PROCEDURES     Unless otherwise noted below, none     Procedures    CRITICAL CARE TIME     I personally spent a total of 0 minutes of critical care time in obtaining history, performing a physical exam, bedside monitoring of interventions, collecting and interpreting tests and discussion with consultants but excluding time spent performing procedures, treating other patients and teaching time. EMERGENCY DEPARTMENT COURSE and DIFFERENTIAL DIAGNOSIS/MDM:     Patient seen and evaluated. At presentation, patient was awake, alert, afebrile, hemodynamically stable, and satting well on room air. Exam done with his nurse, Killian Huffman at bedside. Differential diagnosis includes syphilis, herpes, other STDs such as chlamydia, gonorrhea, others. He was instructed to follow-up with PCP and/or STD clinic for further evaluation and testing which may include HIV and syphilis testing. Sent HSV, trichomonas, chlamydia and gonorrhea testing. Ceftriaxone 500 mg IM, azithromycin 1 g tablet p.o., and penicillin G 2.4 million units in the ED. Trichomonas negative. Urinalysis negative for nitrite and leuk esterase not indicative of UTI. He was instructed to follow-up with the result of the chlamydia, gonorrhea, and HSV. Discharged home with strict and precautions. CONSULTS: (Who and What was discussed)  None    Is this patient to be included in the SEP-1 Core Measure due to severe sepsis or septic shock? No   Exclusion criteria - the patient is NOT to be included for SEP-1 Core Measure due to:  2+ SIRS criteria are not met       During the patient's ED course, the patient was given:  Medications   cefTRIAXone (ROCEPHIN) injection 500 mg (has no administration in time range)   azithromycin (ZITHROMAX) tablet 1,000 mg (has no administration in time range)   penicillin G benzathine and procaine (BICILLIN C-R) 1101130 UNIT per 2ML suspension 2.4 Million Units (has no administration in time range)            I am the Primary Clinician of Record. FINAL IMPRESSION      1. Rash of genitalia    2. Possible exposure to STD          DISPOSITION/PLAN     DISPOSITION  02/18/2023 03:29:05 AM      PATIENT REFERRED TO:  Tami Mattson  [phone]  Call in 2 days        DISCHARGE MEDICATIONS:  Patient was given scripts for the following medications.  I counseled patient how to take these medications:  New Prescriptions    No medications on file       DISCONTINUED MEDICATIONS:  Discontinued Medications    No medications on file       Pt was seen during the [place] 19 pandemic. Appropriate PPE worn by ME during patient encounters. Patient was cared for during a time with constrained hospital bed capacity with nationwide stress on resources and staffing. (This chart was generated in part by using Dragon Dictation system and may contain errors related to that system including errors in grammar, punctuation, and spelling, as well as words and phrases that may be inappropriate.  If there are any questions or concerns please feel free to contact the dictating provider for clarification.)          Layo Kimball MD  02/19/23 0126

## 2023-02-20 LAB
HSV 1 GLYCOPROTEIN G AB IGG: 34.6 IV
HSV 2 GLYCOPROTEIN G AB IGG: 2.61 IV

## 2023-02-21 LAB
C. TRACHOMATIS DNA ,URINE: NEGATIVE
FINAL REPORT: NORMAL
N. GONORRHOEAE DNA, URINE: NEGATIVE
PRELIMINARY: NORMAL